# Patient Record
(demographics unavailable — no encounter records)

---

## 2025-02-14 NOTE — REASON FOR VISIT
[FreeTextEntry2] : 02/14/2025: 56-year-old male here today for new Injury. They have LT elbow. He states that he has fluid in the elbow. Got it drained almost a month ago.

## 2025-02-14 NOTE — ASSESSMENT
[FreeTextEntry1] :  This is an acute complicated injury with the possibility of needing surgery for olecranon bursitis  I recommend the patient take over the counter medication such as Advil/Motrin/Aleve or Tylenol for pain and inflammation  L olecranon bursa aspirated under aseptic conditions with betadine and alcohol. They tolerated it well I injected 2 cc celestone 6 mg/1cc under aseptic conditions into the olecranon bursa That was tolerated well ACE bandage applied for compression   They understand possible recurrence   The risks, benefits, and alternatives to cortisone injection were explained in full to the patient. Risks outlined include but are not limited to infection, sepsis, bleeding, scarring, skin discoloration, temporary increase in pain, syncopal episode, failure to resolve symptoms, allergic reaction, symptom recurrence, and elevation of blood sugar in diabetics. Patient understood the risks. All questions were answered. After discussion of options, patient verbally consented to an injection. Sterile prep was done of the injection site. Patient tolerated the procedure well. Advised to ice the injection site this evening.

## 2025-02-14 NOTE — HISTORY OF PRESENT ILLNESS
[5] : 5 [Dull/Aching] : dull/aching [de-identified] : L elbow  Swelling Drainage by UC 4 weeks ago Has returned